# Patient Record
Sex: FEMALE | Race: WHITE
[De-identification: names, ages, dates, MRNs, and addresses within clinical notes are randomized per-mention and may not be internally consistent; named-entity substitution may affect disease eponyms.]

---

## 2022-07-25 ENCOUNTER — HOSPITAL ENCOUNTER (EMERGENCY)
Dept: HOSPITAL 26 - MED | Age: 27
Discharge: HOME | End: 2022-07-25
Payer: COMMERCIAL

## 2022-07-25 VITALS — DIASTOLIC BLOOD PRESSURE: 79 MMHG | SYSTOLIC BLOOD PRESSURE: 112 MMHG

## 2022-07-25 VITALS — HEIGHT: 70 IN | BODY MASS INDEX: 18.61 KG/M2 | WEIGHT: 130 LBS

## 2022-07-25 VITALS — SYSTOLIC BLOOD PRESSURE: 144 MMHG | DIASTOLIC BLOOD PRESSURE: 98 MMHG

## 2022-07-25 DIAGNOSIS — S01.81XA: Primary | ICD-10-CM

## 2022-07-25 DIAGNOSIS — S63.611A: ICD-10-CM
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
## 2022-07-25 DIAGNOSIS — S00.03XA: ICD-10-CM

## 2022-07-25 DIAGNOSIS — Z98.890: ICD-10-CM

## 2022-07-25 DIAGNOSIS — Z79.899: ICD-10-CM
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
## 2022-07-25 PROCEDURE — 96374 THER/PROPH/DIAG INJ IV PUSH: CPT

## 2022-07-25 PROCEDURE — 99284 EMERGENCY DEPT VISIT MOD MDM: CPT

## 2022-07-25 PROCEDURE — 96361 HYDRATE IV INFUSION ADD-ON: CPT

## 2022-07-25 PROCEDURE — 70450 CT HEAD/BRAIN W/O DYE: CPT

## 2022-07-25 PROCEDURE — 12001 RPR S/N/AX/GEN/TRNK 2.5CM/<: CPT

## 2022-07-25 PROCEDURE — 73140 X-RAY EXAM OF FINGER(S): CPT
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
## 2022-07-25 NOTE — NUR
Patient discharged with v/s stable. Written and verbal after care instructions 
given and explained. 

Patient alert, oriented and verbalized understanding of instructions. 
Ambulatory with steady gait. All questions addressed prior to discharge. ID 
band removed. Patient advised to follow up with PMD. Rx of tylenol and 
ibuprofen given. Patient educated on indication of medication including 
possible reaction and side effects. Opportunity to ask questions provided and 
answered.

## 2022-07-25 NOTE — NUR
pt refused fentyl, stated she gets drug tested and cant have fentyl in her 
system. ermd sin made aware.